# Patient Record
Sex: FEMALE
[De-identification: names, ages, dates, MRNs, and addresses within clinical notes are randomized per-mention and may not be internally consistent; named-entity substitution may affect disease eponyms.]

---

## 2021-06-29 ENCOUNTER — HOSPITAL ENCOUNTER (OUTPATIENT)
Dept: HOSPITAL 90 - RAH | Age: 71
Discharge: HOME | End: 2021-06-29
Attending: INTERNAL MEDICINE
Payer: COMMERCIAL

## 2021-06-29 DIAGNOSIS — N18.1: Primary | ICD-10-CM

## 2021-06-29 PROCEDURE — 76770 US EXAM ABDO BACK WALL COMP: CPT

## 2021-07-22 ENCOUNTER — HOSPITAL ENCOUNTER (OUTPATIENT)
Dept: HOSPITAL 90 - RAH | Age: 71
Discharge: HOME | End: 2021-07-22
Attending: INTERNAL MEDICINE
Payer: COMMERCIAL

## 2021-07-22 DIAGNOSIS — R59.0: Primary | ICD-10-CM

## 2021-07-22 DIAGNOSIS — M25.562: ICD-10-CM

## 2021-07-22 PROCEDURE — 76536 US EXAM OF HEAD AND NECK: CPT

## 2025-01-13 ENCOUNTER — HOSPITAL ENCOUNTER (OUTPATIENT)
Dept: HOSPITAL 90 - RAH | Age: 75
Discharge: HOME | End: 2025-01-13
Attending: INTERNAL MEDICINE
Payer: COMMERCIAL

## 2025-01-13 DIAGNOSIS — R79.89: Primary | ICD-10-CM

## 2025-01-13 PROCEDURE — 76705 ECHO EXAM OF ABDOMEN: CPT

## 2025-01-13 NOTE — [facility]
US ABDOMINAL RUQ\[id number]\LTD



HISTORY: Abnormal liver function tests



COMPARISON: None



TECHNIQUE: Right upper quadrant abdominal ultrasound study was

performed.



FINDINGS: Liver measures 16 cm.  The visualized portion of the pancreas

is within normal limits.  Liver is echogenic consistent with liver

parenchymal disease.  No gallstone is seen. Common duct measures 4 mm.

No evidence of gallbladder wall thickening is seen. Right kidney

measures 10.7 x 3.2 x 4.8 cm. No hydronephrosis is seen of the right

kidney.



IMPRESSION: 

1. No gallstones or ductal dilatation is seen.

2. No hydronephrosis is seen.